# Patient Record
Sex: FEMALE | Race: WHITE | NOT HISPANIC OR LATINO | Employment: FULL TIME | URBAN - NONMETROPOLITAN AREA
[De-identification: names, ages, dates, MRNs, and addresses within clinical notes are randomized per-mention and may not be internally consistent; named-entity substitution may affect disease eponyms.]

---

## 2023-08-01 ENCOUNTER — APPOINTMENT (OUTPATIENT)
Dept: PRIMARY CARE | Facility: CLINIC | Age: 53
End: 2023-08-01

## 2024-01-25 PROBLEM — Z12.11 SCREEN FOR COLON CANCER: Chronic | Status: ACTIVE | Noted: 2024-01-25

## 2024-01-25 PROBLEM — Z12.11 SCREEN FOR COLON CANCER: Status: ACTIVE | Noted: 2024-01-25

## 2024-01-25 PROBLEM — E66.9 OBESITY: Status: ACTIVE | Noted: 2024-01-25

## 2024-01-25 PROBLEM — L30.8 PRURITIC DERMATITIS: Status: ACTIVE | Noted: 2024-01-25

## 2024-01-26 NOTE — PROGRESS NOTES
Subjective      HPI:          Emperatriz Gunn is a 53 y.o. female 53 y.o. is here today for PE/health maintenance      Chief Complaint   Patient presents with    Annual Exam     No forms    Immunizations     Declines vaccination.     GYN Dr Knutson    2008 colonocopy Dr Curtis    Tdap 2013    Shingrix 2020    C/o recurrent sinus infections over the winter - was treated with doxycycline           Pt also due for f/up chronic medical problems-CLL/Small cell Lymphoma- 2010- Heme/Onc Dr Nguyen       Pt last seen 2018        USPTFS recommend  laboratory  screening for HIV in patients ages 15-65          USPTFS recommend  laboratory screening for Hepatitis C for all adults ages 18- 79 years.          Health Maintenance Topics       Topic Date     Yearly Adult Physical today    HIV Screening discussed    Colorectal Cancer Screening- Dr Curtis 2008- due     MMR Vaccines Never done    Hepatitis C Screening discussed    Cervical Cancer Screening Dr Knutson    Zoster Vaccines discussed    Mammogram Dr Knutson    DTaP/Tdap/Td Vaccines Due today     Health Maintenance Topics with due status: Aged Out       Topic Date Due    HIB Vaccines Aged Out    IPV Vaccines Aged Out    Hepatitis A Vaccines Aged Out    Meningococcal Vaccine Aged Out    Rotavirus Vaccines Aged Out    HPV Vaccines Aged Out         Children 21, 23 and 24       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         Immunization History   Administered Date(s) Administered    Flu vaccine (IIV4), preservative free *Check age/dose* 11/21/2017    Flu vaccine, quadrivalent, no egg protein, age 6 month or greater (FLUCELVAX) 09/29/2020    Influenza, Unspecified 10/30/2013, 09/01/2014    Influenza, seasonal, injectable 11/01/2016    Pfizer Purple Cap SARS-CoV-2 03/22/2021    Pneumococcal conjugate vaccine, 13-valent (PREVNAR 13) 10/30/2013    Tdap vaccine, age 7 year and older (BOOSTRIX, ADACEL) 10/19/2009, 10/30/2013    Zoster vaccine, recombinant, adult (SHINGRIX) 11/25/2020  "        Social History     Tobacco Use   Smoking Status Never   Smokeless Tobacco Never                  reports current alcohol use of about 1.0 standard drink of alcohol per week.                 Current Outpatient Medications:     clarithromycin (Biaxin) 500 mg tablet, Take 1 tablet (500 mg) by mouth 2 times a day for 14 days., Disp: 28 tablet, Rfl: 0      ROS:            Objective        PE:    Vitals:    01/29/24 0650   BP: 115/58   BP Location: Left arm   Patient Position: Sitting   BP Cuff Size: Adult   Pulse: 81   Temp: 36.9 °C (98.5 °F)   TempSrc: Temporal   Weight: 114 kg (251 lb 9.6 oz)   Height: 1.778 m (5' 10\")               Pt is A and O x3, NAD  Head- normocephalic and atraumatic,   EYES- conjunctiva- normal   lids- normal  EARS/NOSE- TM's normal, nasopharynx- normal and atraumatic  OROPHARYNX- normal  NECK- supple, FROM  THYROID- NT, normal size, no nodule noted  LYMPH- no cervical lymph nodes palpated   CV- RRR without murmur  PULM- CTA bilaterally, normal respiratory effort  RESPIRATORY EFFORT- normal , no retractions or nasal flaring   ABD- normoactive BS's , soft , NT, no hepatosplenomegaly palpated  EXT- no edema,NT  SKIN- linear ligmented lesion under right big toenail  NEURO- no focal deficits  PSYCH- pleasant, normal judgement and insight    BP Readings from Last 3 Encounters:   01/29/24 115/58         Wt Readings from Last 3 Encounters:   01/29/24 114 kg (251 lb 9.6 oz)         BMI Readings from Last 3 Encounters:   01/29/24 36.10 kg/m²       The number and complexity of problems addressed is considered moderate.  The amount and/or complexity of data reviewed and analyzed is considered moderate. The risk of complications and/or morbidity/mortality of patient is considered moderate. Overall, this patient encounter is considered a moderate risk visit.    Patient's BMI is elevated.  Plan- diet and exercise- BMI is elevated. Need to increase activity on a daily basis especially walking.  Monitor "  total calories per day- decrease carbohydrates and fats. Goal - lose 1-2 pounds per week.    Recommend 150 minutes of moderate-intensity exercise as tolerated per week and 2-3 days of resistance, flexibility, and neuromotor exercises per week.    Normal BMI- 18.5-25    Overweight=  BMI 26-29    Obese= BMI 30-39    Morbidly Obese = BMI >40    Discussed with pt -riding a bike     Has gained 20 pounds since 2018- stress      had Whipple surgery    Daughter had kidney removed     Discussed nutritionist    Father-in-law living with pt- has dementia            Assessment/Plan      Problem List Items Addressed This Visit          Active Problems    Encounter for hepatitis C screening test for low risk patient (Chronic)    Relevant Orders    Hepatitis C Antibody    Immunization due (Chronic)    Relevant Orders    Tdap vaccine, age 7 years and older    Need for shingles vaccine (Chronic)    Relevant Orders    Zoster vaccine, recombinant, adult (SHINGRIX)    Obesity    Screen for colon cancer (Chronic)    Relevant Orders    Colonoscopy Screening; Average Risk Patient    Screening for cholesterol level (Chronic)    Relevant Orders    Lipid Panel    Screening for diabetes mellitus (Chronic)    Relevant Orders    Glucose    Screening for HIV (human immunodeficiency virus) (Chronic)    Relevant Orders    HIV 1/2 Antigen/Antibody Screen with Reflex to Confirmation    Well adult exam - Primary (Chronic)    Relevant Orders    Follow Up In [facility] - PCP - Established     Other Visit Diagnoses       Other acute sinusitis, recurrence not specified        Relevant Medications    clarithromycin (Biaxin) 500 mg tablet            Orders Placed This Encounter   Procedures    Tdap vaccine, age 7 years and older    Zoster vaccine, recombinant, adult (SHINGRIX)    Glucose    Lipid Panel    Hepatitis C Antibody    HIV 1/2 Antigen/Antibody Screen with Reflex to Confirmation       Refer for colonoscopy         Ordered  labs        Patient is going to follow up with her Derm Dr Rogers for skin lesion right big toenial       Due to allergies- will use Biaxin and if no better consider referral to allergist - patient considers possible allergy at work     Follow up 12 months           Recommendations for women annual wellness exam:   Make sure screenings for cervical and breast cancer are up to date if applicable- pap smears age 21-65  mammogram starting at age 35- 40 or sooner if positive family history of breast cancer   colonoscopy at age 45, earlier if positive family history for breast or colon cancer   Screen for osteoporosis with DXA bone scan starting at age 65 or sooner if risk factors present (long term steroid use, smoking, heavy alcohol use, history of fracture, rheumatoid arthritis, low body weight, family history of hip fracture)  Screening for lung cancer with low-dose CT in all adults age 50-80 who have a 20 pack-year smoking history and currently smoke or have quit within the past 15 years; and are symptom free/no prior pulmonary diagnosis  Gardisil vaccine age 9-26 years of age   Follow a healthy diet (Examples, Dash diet, Mediterranean diet)  Exercise 150 minutes per week   Maintain healthy weight (BMI < 25)  Do not smoke   Alcohol in moderation (up to 1 drink/day)  Get enough sleep (7-8 hours/night)  Take a prenatal vitamin with folic acid if possibility of pregnancy   Make sure immunizations are up to date   Recommend minimum 1,000 mg calcium and 600-800 IU vitamin D daily (combination of diet + supplement)- unless there is a contraindication   Visit dentist twice yearly      If you are less than 60 years old, have diabetes mellitus, or chronic kidney disease, your goal blood pressure is < 140/90.  If you are older than 60 years old and do not have diabetes or kidney disease, your goal blood pressure is < 150/90.

## 2024-01-29 ENCOUNTER — OFFICE VISIT (OUTPATIENT)
Dept: PRIMARY CARE | Facility: CLINIC | Age: 54
End: 2024-01-29
Payer: COMMERCIAL

## 2024-01-29 ENCOUNTER — LAB (OUTPATIENT)
Dept: LAB | Facility: LAB | Age: 54
End: 2024-01-29
Payer: COMMERCIAL

## 2024-01-29 VITALS
DIASTOLIC BLOOD PRESSURE: 58 MMHG | TEMPERATURE: 98.5 F | SYSTOLIC BLOOD PRESSURE: 115 MMHG | HEART RATE: 81 BPM | BODY MASS INDEX: 36.02 KG/M2 | WEIGHT: 251.6 LBS | HEIGHT: 70 IN

## 2024-01-29 DIAGNOSIS — Z00.00 WELL ADULT EXAM: Primary | Chronic | ICD-10-CM

## 2024-01-29 DIAGNOSIS — Z13.1 SCREENING FOR DIABETES MELLITUS: Chronic | ICD-10-CM

## 2024-01-29 DIAGNOSIS — Z13.220 SCREENING FOR CHOLESTEROL LEVEL: Chronic | ICD-10-CM

## 2024-01-29 DIAGNOSIS — Z11.59 ENCOUNTER FOR HEPATITIS C SCREENING TEST FOR LOW RISK PATIENT: Chronic | ICD-10-CM

## 2024-01-29 DIAGNOSIS — Z23 IMMUNIZATION DUE: Chronic | ICD-10-CM

## 2024-01-29 DIAGNOSIS — Z11.4 SCREENING FOR HIV (HUMAN IMMUNODEFICIENCY VIRUS): Chronic | ICD-10-CM

## 2024-01-29 DIAGNOSIS — Z12.11 SCREEN FOR COLON CANCER: ICD-10-CM

## 2024-01-29 DIAGNOSIS — Z23 NEED FOR SHINGLES VACCINE: Chronic | ICD-10-CM

## 2024-01-29 DIAGNOSIS — J01.80 OTHER ACUTE SINUSITIS, RECURRENCE NOT SPECIFIED: ICD-10-CM

## 2024-01-29 DIAGNOSIS — E66.09 CLASS 2 OBESITY DUE TO EXCESS CALORIES WITH BODY MASS INDEX (BMI) OF 36.0 TO 36.9 IN ADULT, UNSPECIFIED WHETHER SERIOUS COMORBIDITY PRESENT: Chronic | ICD-10-CM

## 2024-01-29 LAB
CHOLEST SERPL-MCNC: 192 MG/DL (ref 0–199)
CHOLESTEROL/HDL RATIO: 5.4
GLUCOSE SERPL-MCNC: 99 MG/DL (ref 74–99)
HCV AB SER QL: NONREACTIVE
HDLC SERPL-MCNC: 35.5 MG/DL
HIV 1+2 AB+HIV1 P24 AG SERPL QL IA: NONREACTIVE
LDLC SERPL CALC-MCNC: 113 MG/DL
NON HDL CHOLESTEROL: 157 MG/DL (ref 0–149)
TRIGL SERPL-MCNC: 219 MG/DL (ref 0–149)
VLDL: 44 MG/DL (ref 0–40)

## 2024-01-29 PROCEDURE — 3008F BODY MASS INDEX DOCD: CPT | Performed by: FAMILY MEDICINE

## 2024-01-29 PROCEDURE — 82947 ASSAY GLUCOSE BLOOD QUANT: CPT

## 2024-01-29 PROCEDURE — 99386 PREV VISIT NEW AGE 40-64: CPT | Performed by: FAMILY MEDICINE

## 2024-01-29 PROCEDURE — 1036F TOBACCO NON-USER: CPT | Performed by: FAMILY MEDICINE

## 2024-01-29 PROCEDURE — 90471 IMMUNIZATION ADMIN: CPT | Performed by: FAMILY MEDICINE

## 2024-01-29 PROCEDURE — 80061 LIPID PANEL: CPT

## 2024-01-29 PROCEDURE — 36415 COLL VENOUS BLD VENIPUNCTURE: CPT

## 2024-01-29 PROCEDURE — 90472 IMMUNIZATION ADMIN EACH ADD: CPT | Performed by: FAMILY MEDICINE

## 2024-01-29 PROCEDURE — 90715 TDAP VACCINE 7 YRS/> IM: CPT | Performed by: FAMILY MEDICINE

## 2024-01-29 PROCEDURE — 86803 HEPATITIS C AB TEST: CPT

## 2024-01-29 PROCEDURE — 90750 HZV VACC RECOMBINANT IM: CPT | Performed by: FAMILY MEDICINE

## 2024-01-29 PROCEDURE — 87389 HIV-1 AG W/HIV-1&-2 AB AG IA: CPT

## 2024-01-29 RX ORDER — CLARITHROMYCIN 500 MG/1
500 TABLET, FILM COATED ORAL 2 TIMES DAILY
Qty: 28 TABLET | Refills: 0 | Status: SHIPPED | OUTPATIENT
Start: 2024-01-29 | End: 2024-02-12

## 2025-01-27 NOTE — PROGRESS NOTES
Subjective      HPI:          Emperatriz Gunn is a 54 y.o. female 54 y.o. is here today for PE/health maintenance      Chief Complaint   Patient presents with    Annual Exam           Colonosocpy ordered 2024- has pt had done yet? Pt has not done this yet          GYN Dr Knutson    2008 colonocopy Dr Curtis    Tdap 1/29/24    Shingrix 2020 and 1/29/24      Discussed BMI- 36          Pt also due for f/up chronic medical problems-CLL/Small cell Lymphoma- 2010- Heme/Onc Dr Nguyen             Pt declines labs today  Per pt had some labs 11/2024 - per hem/onc and more are ordered     Steps per day      Fasting - will consider lipid       Glucose 95           Health Maintenance Topics       Topic Date     Yearly Adult Physical today    HIV Screening 1/2024    Colorectal Cancer Screening- Dr Curtis 2008- due     Hepatitis C Screening 1/2024    Cervical Cancer Screening Dr Knutson    Zoster Vaccines Had both     Mammogram Dr Knutson    DTaP/Tdap/Td Vaccines 1/2024         Children 22, 24 and 25      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            Immunization History   Administered Date(s) Administered    COVID-19, mRNA, LNP-S, PF, 30 mcg/0.3 mL dose 03/22/2021    Flu vaccine (IIV4), preservative free *Check age/dose* 11/21/2017    Flu vaccine, quadrivalent, no egg protein, age 6 month or greater (FLUCELVAX) 09/29/2020    Influenza, Unspecified 10/30/2013, 09/01/2014    Influenza, seasonal, injectable 11/01/2016    Pneumococcal conjugate vaccine, 13-valent (PREVNAR 13) 10/30/2013    Tdap vaccine, age 7 year and older (BOOSTRIX, ADACEL) 10/19/2009, 10/30/2013, 01/29/2024    Zoster vaccine, recombinant, adult (SHINGRIX) 11/25/2020, 01/29/2024         Social History     Tobacco Use   Smoking Status Never   Smokeless Tobacco Never                  reports current alcohol use of about 1.0 standard drink of alcohol per week.                No current outpatient medications on file.      ROS:            Objective   "      PE:    Vitals:    01/31/25 0709   BP: 127/76   BP Location: Left arm   Patient Position: Sitting   BP Cuff Size: Large adult   Pulse: 73   Resp: 14   Temp: 36.1 °C (97 °F)   TempSrc: Temporal   SpO2: 97%   Weight: 116 kg (254 lb 14.4 oz)   Height: 1.787 m (5' 10.35\")                 Pt is A and O x3, NAD  Head- normocephalic and atraumatic,   EYES- conjunctiva- normal   lids- normal  EARS/NOSE- TM's normal, nasopharynx- normal and atraumatic  OROPHARYNX- normal  NECK- supple, FROM  THYROID- NT, normal size, no nodule noted  LYMPH- no cervical lymph nodes palpated   CV- RRR without murmur  PULM- CTA bilaterally, normal respiratory effort  RESPIRATORY EFFORT- normal , no retractions or nasal flaring   ABD- normoactive BS's , soft , NT, no hepatosplenomegaly palpated  EXT- no edema,NT  SKIN- linear ligmented lesion under right big toenail  NEURO- no focal deficits  PSYCH- pleasant, normal judgement and insight    BP Readings from Last 3 Encounters:   01/31/25 127/76   01/29/24 115/58   12/01/23 126/79         Wt Readings from Last 3 Encounters:   01/31/25 116 kg (254 lb 14.4 oz)   01/29/24 114 kg (251 lb 9.6 oz)   12/01/23 113 kg (250 lb)         BMI Readings from Last 3 Encounters:   01/31/25 36.21 kg/m²   01/29/24 36.10 kg/m²       The number and complexity of problems addressed is considered moderate.  The amount and/or complexity of data reviewed and analyzed is considered moderate. The risk of complications and/or morbidity/mortality of patient is considered moderate. Overall, this patient encounter is considered a moderate risk visit.    Patient's BMI is elevated.  Plan- diet and exercise- BMI is elevated. Need to increase activity on a daily basis especially walking.  Monitor  total calories per day- decrease carbohydrates and fats. Goal - lose 1-2 pounds per week.    Recommend 150 minutes of moderate-intensity exercise as tolerated per week and 2-3 days of resistance, flexibility, and neuromotor exercises " per week.    Normal BMI- 18.5-25    Overweight=  BMI 26-29    Obese= BMI 30-39    Morbidly Obese = BMI >40    Discussed with pt -riding a bike     Has gained 20 pounds since 2018- stress      had Whipple surgery    Daughter had kidney removed     Discussed nutritionist    Father-in-law living with pt- has dementia            Assessment/Plan        Assessment & Plan  Well adult exam    Orders:    Follow Up In [facility] - PCP - Established    Chronic lymphocytic leukemia (Multi)   2010- Heme/Onc Dr Nguyen        Class 2 obesity due to excess calories with body mass index (BMI) of 36.0 to 36.9 in adult, unspecified whether serious comorbidity present  Patient's BMI is elevated.  Plan- diet and exercise- BMI is elevated. Need to increase activity on a daily basis especially walking.  Monitor  total calories per day- decrease carbohydrates and fats. Goal - lose 1-2 pounds per week.    Recommend 150 minutes of moderate-intensity exercise as tolerated per week and 2-3 days of resistance, flexibility, and neuromotor exercises per week.    Normal BMI- 18.5-25    Overweight=  BMI 26-29    Obese= BMI 30-39    Morbidly Obese = BMI >40           Screening for cholesterol level    Orders:    Lipid Panel; Future                            Follow up 12 months- PE            Recommendations for women annual wellness exam:   Make sure screenings for cervical and breast cancer are up to date if applicable- pap smears age 21-65  mammogram starting at age 35- 40 or sooner if positive family history of breast cancer   colonoscopy at age 45, earlier if positive family history for breast or colon cancer   Screen for osteoporosis with DXA bone scan starting at age 65 or sooner if risk factors present (long term steroid use, smoking, heavy alcohol use, history of fracture, rheumatoid arthritis, low body weight, family history of hip fracture)  Screening for lung cancer with low-dose CT in all adults age 50-80 who have a 20  pack-year smoking history and currently smoke or have quit within the past 15 years; and are symptom free/no prior pulmonary diagnosis  Gardisil vaccine age 9-26 years of age   Follow a healthy diet (Examples, Dash diet, Mediterranean diet)  Exercise 150 minutes per week   Maintain healthy weight (BMI < 25)  Do not smoke   Alcohol in moderation (up to 1 drink/day)  Get enough sleep (7-8 hours/night)  Take a prenatal vitamin with folic acid if possibility of pregnancy   Make sure immunizations are up to date   Recommend minimum 1,000 mg calcium and 600-800 IU vitamin D daily (combination of diet + supplement)- unless there is a contraindication   Visit dentist twice yearly      If you are less than 60 years old, have diabetes mellitus, or chronic kidney disease, your goal blood pressure is < 140/90.  If you are older than 60 years old and do not have diabetes or kidney disease, your goal blood pressure is < 150/90.

## 2025-01-31 ENCOUNTER — APPOINTMENT (OUTPATIENT)
Dept: PRIMARY CARE | Facility: CLINIC | Age: 55
End: 2025-01-31
Payer: COMMERCIAL

## 2025-01-31 VITALS
RESPIRATION RATE: 14 BRPM | TEMPERATURE: 97 F | OXYGEN SATURATION: 97 % | HEART RATE: 73 BPM | HEIGHT: 70 IN | SYSTOLIC BLOOD PRESSURE: 127 MMHG | DIASTOLIC BLOOD PRESSURE: 76 MMHG | WEIGHT: 254.9 LBS | BODY MASS INDEX: 36.49 KG/M2

## 2025-01-31 DIAGNOSIS — E66.09 CLASS 2 OBESITY DUE TO EXCESS CALORIES WITH BODY MASS INDEX (BMI) OF 36.0 TO 36.9 IN ADULT, UNSPECIFIED WHETHER SERIOUS COMORBIDITY PRESENT: ICD-10-CM

## 2025-01-31 DIAGNOSIS — Z00.00 WELL ADULT EXAM: Primary | Chronic | ICD-10-CM

## 2025-01-31 DIAGNOSIS — Z13.220 SCREENING FOR CHOLESTEROL LEVEL: ICD-10-CM

## 2025-01-31 DIAGNOSIS — C91.10 CHRONIC LYMPHOCYTIC LEUKEMIA (MULTI): Chronic | ICD-10-CM

## 2025-01-31 DIAGNOSIS — E66.812 CLASS 2 OBESITY DUE TO EXCESS CALORIES WITH BODY MASS INDEX (BMI) OF 36.0 TO 36.9 IN ADULT, UNSPECIFIED WHETHER SERIOUS COMORBIDITY PRESENT: ICD-10-CM

## 2025-01-31 PROCEDURE — 3008F BODY MASS INDEX DOCD: CPT | Performed by: FAMILY MEDICINE

## 2025-01-31 PROCEDURE — 1036F TOBACCO NON-USER: CPT | Performed by: FAMILY MEDICINE

## 2025-01-31 PROCEDURE — 99396 PREV VISIT EST AGE 40-64: CPT | Performed by: FAMILY MEDICINE

## 2025-01-31 ASSESSMENT — PATIENT HEALTH QUESTIONNAIRE - PHQ9
1. LITTLE INTEREST OR PLEASURE IN DOING THINGS: NOT AT ALL
SUM OF ALL RESPONSES TO PHQ9 QUESTIONS 1 AND 2: 0
2. FEELING DOWN, DEPRESSED OR HOPELESS: NOT AT ALL

## 2025-02-01 LAB
CHOLEST SERPL-MCNC: 245 MG/DL
CHOLEST/HDLC SERPL: 6 (CALC)
HDLC SERPL-MCNC: 41 MG/DL
LDLC SERPL CALC-MCNC: 167 MG/DL (CALC)
NONHDLC SERPL-MCNC: 204 MG/DL (CALC)
TRIGL SERPL-MCNC: 207 MG/DL

## 2025-02-04 DIAGNOSIS — E78.00 HYPERCHOLESTEROLEMIA: Primary | ICD-10-CM

## 2025-08-05 DIAGNOSIS — Z12.31 ENCOUNTER FOR SCREENING MAMMOGRAM FOR BREAST CANCER: ICD-10-CM

## 2026-02-06 ENCOUNTER — APPOINTMENT (OUTPATIENT)
Dept: PRIMARY CARE | Facility: CLINIC | Age: 56
End: 2026-02-06
Payer: COMMERCIAL